# Patient Record
Sex: FEMALE | URBAN - METROPOLITAN AREA
[De-identification: names, ages, dates, MRNs, and addresses within clinical notes are randomized per-mention and may not be internally consistent; named-entity substitution may affect disease eponyms.]

---

## 2023-11-15 ENCOUNTER — OFFICE VISIT (OUTPATIENT)
Age: 58
End: 2023-11-15

## 2023-11-15 VITALS
WEIGHT: 139.5 LBS | OXYGEN SATURATION: 97 % | SYSTOLIC BLOOD PRESSURE: 144 MMHG | HEART RATE: 80 BPM | BODY MASS INDEX: 25.67 KG/M2 | DIASTOLIC BLOOD PRESSURE: 81 MMHG | HEIGHT: 62 IN | RESPIRATION RATE: 18 BRPM

## 2023-11-15 DIAGNOSIS — Z13.220 SCREENING FOR LIPID DISORDERS: ICD-10-CM

## 2023-11-15 DIAGNOSIS — R03.0 ELEVATED BLOOD PRESSURE READING IN OFFICE WITHOUT DIAGNOSIS OF HYPERTENSION: ICD-10-CM

## 2023-11-15 DIAGNOSIS — Z76.89 ENCOUNTER TO ESTABLISH CARE WITH NEW DOCTOR: ICD-10-CM

## 2023-11-15 DIAGNOSIS — M79.644 FINGER PAIN, RIGHT: Primary | ICD-10-CM

## 2023-11-15 DIAGNOSIS — E66.3 OVERWEIGHT (BMI 25.0-29.9): ICD-10-CM

## 2023-11-15 DIAGNOSIS — Z12.39 ENCOUNTER FOR OTHER SCREENING FOR MALIGNANT NEOPLASM OF BREAST: ICD-10-CM

## 2023-11-15 DIAGNOSIS — Z13.1 SCREENING FOR DIABETES MELLITUS: ICD-10-CM

## 2023-11-15 DIAGNOSIS — Z23 ENCOUNTER FOR IMMUNIZATION: ICD-10-CM

## 2023-11-15 LAB
ALBUMIN SERPL-MCNC: 4.1 G/DL (ref 3.5–5)
ALBUMIN/GLOB SERPL: 1.2 (ref 1.1–2.2)
ALP SERPL-CCNC: 96 U/L (ref 45–117)
ALT SERPL-CCNC: 62 U/L (ref 12–78)
ANION GAP SERPL CALC-SCNC: 6 MMOL/L (ref 5–15)
AST SERPL-CCNC: 38 U/L (ref 15–37)
BILIRUB SERPL-MCNC: 0.5 MG/DL (ref 0.2–1)
BUN SERPL-MCNC: 12 MG/DL (ref 6–20)
BUN/CREAT SERPL: 21 (ref 12–20)
CALCIUM SERPL-MCNC: 9.3 MG/DL (ref 8.5–10.1)
CHLORIDE SERPL-SCNC: 106 MMOL/L (ref 97–108)
CHOLEST SERPL-MCNC: 197 MG/DL
CO2 SERPL-SCNC: 28 MMOL/L (ref 21–32)
CREAT SERPL-MCNC: 0.57 MG/DL (ref 0.55–1.02)
ERYTHROCYTE [DISTWIDTH] IN BLOOD BY AUTOMATED COUNT: 12.9 % (ref 11.5–14.5)
EST. AVERAGE GLUCOSE BLD GHB EST-MCNC: 166 MG/DL
GLOBULIN SER CALC-MCNC: 3.3 G/DL (ref 2–4)
GLUCOSE SERPL-MCNC: 140 MG/DL (ref 65–100)
HBA1C MFR BLD: 7.4 % (ref 4–5.6)
HCT VFR BLD AUTO: 40.9 % (ref 35–47)
HDLC SERPL-MCNC: 47 MG/DL
HDLC SERPL: 4.2 (ref 0–5)
HGB BLD-MCNC: 12.9 G/DL (ref 11.5–16)
LDLC SERPL CALC-MCNC: 123.8 MG/DL (ref 0–100)
MCH RBC QN AUTO: 25.7 PG (ref 26–34)
MCHC RBC AUTO-ENTMCNC: 31.5 G/DL (ref 30–36.5)
MCV RBC AUTO: 81.5 FL (ref 80–99)
NRBC # BLD: 0 K/UL (ref 0–0.01)
NRBC BLD-RTO: 0 PER 100 WBC
PLATELET # BLD AUTO: 280 K/UL (ref 150–400)
PMV BLD AUTO: 11 FL (ref 8.9–12.9)
POTASSIUM SERPL-SCNC: 4.2 MMOL/L (ref 3.5–5.1)
PROT SERPL-MCNC: 7.4 G/DL (ref 6.4–8.2)
RBC # BLD AUTO: 5.02 M/UL (ref 3.8–5.2)
SODIUM SERPL-SCNC: 140 MMOL/L (ref 136–145)
TRIGL SERPL-MCNC: 131 MG/DL
VLDLC SERPL CALC-MCNC: 26.2 MG/DL
WBC # BLD AUTO: 6.9 K/UL (ref 3.6–11)

## 2023-11-15 SDOH — ECONOMIC STABILITY: INCOME INSECURITY: HOW HARD IS IT FOR YOU TO PAY FOR THE VERY BASICS LIKE FOOD, HOUSING, MEDICAL CARE, AND HEATING?: NOT HARD AT ALL

## 2023-11-15 SDOH — ECONOMIC STABILITY: FOOD INSECURITY: WITHIN THE PAST 12 MONTHS, THE FOOD YOU BOUGHT JUST DIDN'T LAST AND YOU DIDN'T HAVE MONEY TO GET MORE.: NEVER TRUE

## 2023-11-15 SDOH — ECONOMIC STABILITY: HOUSING INSECURITY
IN THE LAST 12 MONTHS, WAS THERE A TIME WHEN YOU DID NOT HAVE A STEADY PLACE TO SLEEP OR SLEPT IN A SHELTER (INCLUDING NOW)?: NO

## 2023-11-15 SDOH — ECONOMIC STABILITY: FOOD INSECURITY: WITHIN THE PAST 12 MONTHS, YOU WORRIED THAT YOUR FOOD WOULD RUN OUT BEFORE YOU GOT MONEY TO BUY MORE.: NEVER TRUE

## 2023-11-15 ASSESSMENT — PATIENT HEALTH QUESTIONNAIRE - PHQ9
SUM OF ALL RESPONSES TO PHQ QUESTIONS 1-9: 0
2. FEELING DOWN, DEPRESSED OR HOPELESS: 0
SUM OF ALL RESPONSES TO PHQ QUESTIONS 1-9: 0
1. LITTLE INTEREST OR PLEASURE IN DOING THINGS: 0
SUM OF ALL RESPONSES TO PHQ QUESTIONS 1-9: 0
SUM OF ALL RESPONSES TO PHQ9 QUESTIONS 1 & 2: 0
SUM OF ALL RESPONSES TO PHQ QUESTIONS 1-9: 0

## 2023-11-15 NOTE — PROGRESS NOTES
Iva Bello is an 60 y.o. female who presents to [facility]   Patient was previously receiving care at: [address] [facility] history significant for: arthritis    Patient requesting a referral for her arthritis. Right hand and arm. - bothers her worse during and after house work  - pain has been gradually getting worse over years  - ibuprofen and tylenol for pain with some relief  - pain worse to right pinky  - reports that the pain shoots up to her right arm up to her shoulder  - denies swelling, numbness, weakness  - no other joint pain    PSHx: hysterectomy    Fhx: T2DM    Shx  - tob: none  - alcohol: none  - THC/drugs: none    HM:  - due for PAP - 2019  - due for colonoscopy - declining referral today  - due for mammogram - last was 2019  - due for flu      Review of Systems   General/Constitutional:   No headache, fever, fatigue, weight loss or weight gain       Eyes:   No redness, pruritis, pain, visual changes, swelling, or discharge      Ears:    No pain, loss or changes in hearing     Neck:   No swelling, masses, stiffness, pain, or limited movement     Cardiac:    No chest pain      Respiratory:   No cough or shortness of breath     GI:   No nausea/vomiting, diarrhea, abdominal pain, bloody or dark stools       :   No dysuria or  hematuria    Neurological:   No loss of consciousness, dizziness, seizures, dysarthria, cognitive changes, memory changes,  problems with balance, or unilateral weakness     Skin: No rash     Current Medications  Current medications include:   No current outpatient medications on file. No current facility-administered medications for this visit. Allergies  No Known Allergies    Past Medical History  No past medical history on file. Past Surgical History   No past surgical history on file.     Family History  Family History   Problem Relation Age of Onset    Diabetes Mother     Osteoporosis Father     Diabetes Sister     Diabetes Brother        No FH of

## 2023-11-15 NOTE — PATIENT INSTRUCTIONS
- try voltaren gel four times daily for the next week for your pain  - check your blood pressure at home. Goal blood pressure is less than 140 (top number) and less than 90 (bottom number). Write down these values.   - call the provided number to schedule for a mammogram

## 2023-11-15 NOTE — PROGRESS NOTES
Vitals:    11/15/23 0802 11/15/23 0843   BP: (!) 145/84 (!) 144/81   Site: Left Upper Arm Left Upper Arm   Position: Sitting Sitting   Cuff Size: Medium Adult Medium Adult   Pulse: 80    Resp: 18    SpO2: 97%    Weight: 63.3 kg (139 lb 8 oz)    Height: 1.575 m (5' 2\")      BP Recheck Noted Above.  Physician notified

## 2023-11-15 NOTE — PROGRESS NOTES
Need referral for colo screening  Need MATEO screening  Need PAP done  Patient states she would like to see a rheumatologist for arthritis  When 13yrs she did break her clavicle and it repaired. She has  a bump on her upper right chest shes concerned about that she noticed for a while.   Chief Complaint   Patient presents with    New Patient     Vitals:    11/15/23 0802   BP: (!) 145/84   Pulse: 80   Resp: 18   SpO2: 97%

## 2023-11-28 ENCOUNTER — TELEPHONE (OUTPATIENT)
Age: 58
End: 2023-11-28

## 2023-11-28 NOTE — TELEPHONE ENCOUNTER
Called patient to discuss lab results:  Mildly elevated LDL cholesterol to 123.8  A1c of 7.4 with new diagnosis of T2DM  Normal GFR, creatinine  Normal CBC    Patient was about to board a plane for x2 weeks of vacation. Full discuss differed until follow up appointment on 12/15/23. Will plan for initiation of metformin, referral to diabetes education. Will discuss initiation of statin medication. Will discuss BP at that time.     Scotty Mon MD

## 2023-12-15 ENCOUNTER — OFFICE VISIT (OUTPATIENT)
Age: 58
End: 2023-12-15

## 2023-12-15 VITALS
WEIGHT: 137 LBS | HEIGHT: 62 IN | TEMPERATURE: 99.1 F | RESPIRATION RATE: 18 BRPM | OXYGEN SATURATION: 98 % | BODY MASS INDEX: 25.21 KG/M2 | HEART RATE: 84 BPM | DIASTOLIC BLOOD PRESSURE: 90 MMHG | SYSTOLIC BLOOD PRESSURE: 138 MMHG

## 2023-12-15 DIAGNOSIS — E78.00 ELEVATED LDL CHOLESTEROL LEVEL: ICD-10-CM

## 2023-12-15 DIAGNOSIS — I10 ESSENTIAL HYPERTENSION: ICD-10-CM

## 2023-12-15 DIAGNOSIS — E11.65 TYPE 2 DIABETES MELLITUS WITH HYPERGLYCEMIA, WITHOUT LONG-TERM CURRENT USE OF INSULIN (HCC): Primary | ICD-10-CM

## 2023-12-15 LAB
CREAT UR-MCNC: 41.7 MG/DL
MICROALBUMIN UR-MCNC: <0.5 MG/DL
MICROALBUMIN/CREAT UR-RTO: <12 MG/G (ref 0–30)

## 2023-12-15 RX ORDER — METFORMIN HYDROCHLORIDE 500 MG/1
500 TABLET, EXTENDED RELEASE ORAL
Qty: 90 TABLET | Refills: 1 | Status: SHIPPED | OUTPATIENT
Start: 2023-12-15

## 2023-12-15 RX ORDER — LANCETS 30 GAUGE
1 EACH MISCELLANEOUS DAILY
Qty: 100 EACH | Refills: 5 | Status: SHIPPED | OUTPATIENT
Start: 2023-12-15

## 2023-12-15 RX ORDER — BLOOD-GLUCOSE METER
1 KIT MISCELLANEOUS DAILY
Qty: 1 KIT | Refills: 0 | Status: SHIPPED | OUTPATIENT
Start: 2023-12-15

## 2023-12-15 RX ORDER — LOSARTAN POTASSIUM 25 MG/1
25 TABLET ORAL DAILY
Qty: 90 TABLET | Refills: 1 | Status: SHIPPED | OUTPATIENT
Start: 2023-12-15

## 2023-12-15 RX ORDER — GLUCOSAMINE HCL/CHONDROITIN SU 500-400 MG
CAPSULE ORAL
Qty: 100 STRIP | Refills: 1 | Status: SHIPPED | OUTPATIENT
Start: 2023-12-15

## 2023-12-15 ASSESSMENT — PATIENT HEALTH QUESTIONNAIRE - PHQ9
SUM OF ALL RESPONSES TO PHQ QUESTIONS 1-9: 0
SUM OF ALL RESPONSES TO PHQ9 QUESTIONS 1 & 2: 0
1. LITTLE INTEREST OR PLEASURE IN DOING THINGS: 0
SUM OF ALL RESPONSES TO PHQ QUESTIONS 1-9: 0
2. FEELING DOWN, DEPRESSED OR HOPELESS: 0

## 2023-12-15 NOTE — PATIENT INSTRUCTIONS
- start monitoring your home blood sugar. Check this once daily in the morning and as needed after meals or with symptoms  - keep a log of your blood sugars and bring this with you to your next appointment  - call to schedule an appointment with the diabetes educator  - start taking metformin 500mg once daily (with breakfast) for one week. After one week increase to twice daily (add an dose with dinner)    - Start monitoring your blood pressure at home and keep a log of these values. Check 3-4 times per week. - start taking losartan once daily. This is a blood pressure medication that is also commonly used in diabetes.

## 2024-01-08 ENCOUNTER — OFFICE VISIT (OUTPATIENT)
Age: 59
End: 2024-01-08
Payer: COMMERCIAL

## 2024-01-08 DIAGNOSIS — E11.9 TYPE 2 DIABETES MELLITUS WITHOUT COMPLICATION, WITHOUT LONG-TERM CURRENT USE OF INSULIN (HCC): Primary | ICD-10-CM

## 2024-01-08 PROCEDURE — G0108 DIAB MANAGE TRN  PER INDIV: HCPCS

## 2024-01-08 NOTE — PROGRESS NOTES
[facility]  Diabetes Self-Management Education & Support Program    Reason for Referral: DSMES  Referral Source: Joseph Kearney MD  Services requested: DSMES       ASSESSMENT    From my perspective, the participant would benefit from DSMES specifically related to reducing risks, healthy eating, monitoring, taking medications, physical activity, healthy coping, and problem solving. Will adapt DSMES program to build on participant's skills score, confidence score, and preparedness score as noted in the Diabetes Skills, Confidence, and Preparedness Index.    During the program, we will focus on providing DSMES that specifically addresses participant's interest in healthy eating and physical activity, as shown by their reported readiness to change.    The participant would be best served by attending weekly group class series.    Diabetes Self-Management Education Follow-up Visit: February 2023       Clinical Presentation  Chantale Betancur is a 58 y.o.  female referred for diabetes self-management education. Participant has Type 2 DM not on insulin for <1 year. Family history positive for diabetes. Patient reports not receiving DSMES services in the past. Most recent A1c value:   Lab Results   Component Value Date/Time    LABA1C 7.4 11/15/2023 09:12 AM         Diabetes-related medications:  Current dosing: metFORMIN - 500 MG    Blood Pressure Management  losartan - 25 MG      Lipid Management  This patient does not have an active medication from one of the medication groupers.      Clot Prevention  This patient does not have an active medication from one of the medication groupers.    Learning Assessment  Learning objectives Educator assessment (1/8/2024)   Diabetes Disease Process  The participant can   A) describe diabetes in basic terms;   B) state the type of diabetes they have; &   C) state accepted blood glucose targets.     Healthy Eating  The participant can   A) identify

## 2024-01-15 NOTE — PROGRESS NOTES
[facility]  [address] [place] [address]   Office [phone], Fax [phone]      Chief Complaint:     Chief Complaint   Patient presents with    Diabetes       SUBJECTIVE  Chantale Betancur is an 58 y.o. female who presents for T2DM and HTN follow up    #T2DM  - metformin 500mg once daily currently  - diet: mornings has cut down on bread. Has reduced other carbohydrate consumption including rice  - fasting morning home blood sugars ranging from 110-130  - denies numbness, weakness, diarrhea, abdominal pain, nuasea    #HTN  - home blood pressure generally 130-140/80s  - denies chest pain, headache, vision changes, shortness of breath    HM:  - due for pap.  - due for mammogram. Order placed at prior visit.  - due for colonoscopy. Previously declined. Remains hesitant today. Denies change in bowel habits, melena, hematochezia  - due for diabetic eye exam    Allergies   No Known Allergies      Medications   Current Outpatient Medications   Medication Sig    losartan (COZAAR) 25 MG tablet Take 1 tablet by mouth daily    metFORMIN (GLUCOPHAGE-XR) 500 MG extended release tablet Take 1 tablet by mouth with breakfast and with evening meal . After one week increase to twice daily - with breakfast and dinner    glucose monitoring kit 1 kit by Does not apply route daily Dispense per insurance coverage    blood glucose monitor strips Test four times a day & as needed for symptoms of irregular blood glucose. Dispense sufficient amount for indicated testing frequency plus additional to accommodate PRN testing needs.    Lancets MISC 1 each by Does not apply route daily     No current facility-administered medications for this visit.         Past surgical, medical and social history reviewed and updated as relevant to presenting concerns.     ROS: See HPI      OBJECTIVE  BP (!) 144/88   Pulse 75   Temp 99.1 °F (37.3 °C) (Oral)   Wt 60.8 kg (134 lb)   SpO2 98%   BMI 24.51 kg/m²

## 2024-01-17 ENCOUNTER — OFFICE VISIT (OUTPATIENT)
Age: 59
End: 2024-01-17
Payer: COMMERCIAL

## 2024-01-17 VITALS
OXYGEN SATURATION: 98 % | TEMPERATURE: 99.1 F | BODY MASS INDEX: 24.51 KG/M2 | WEIGHT: 134 LBS | HEART RATE: 75 BPM | SYSTOLIC BLOOD PRESSURE: 144 MMHG | DIASTOLIC BLOOD PRESSURE: 88 MMHG

## 2024-01-17 DIAGNOSIS — E78.00 ELEVATED LDL CHOLESTEROL LEVEL: ICD-10-CM

## 2024-01-17 DIAGNOSIS — E11.65 TYPE 2 DIABETES MELLITUS WITH HYPERGLYCEMIA, WITHOUT LONG-TERM CURRENT USE OF INSULIN (HCC): Primary | ICD-10-CM

## 2024-01-17 DIAGNOSIS — I10 ELEVATED BLOOD PRESSURE READING IN OFFICE WITH DIAGNOSIS OF HYPERTENSION: ICD-10-CM

## 2024-01-17 DIAGNOSIS — I10 ESSENTIAL HYPERTENSION: ICD-10-CM

## 2024-01-17 PROCEDURE — 99214 OFFICE O/P EST MOD 30 MIN: CPT

## 2024-01-17 PROCEDURE — 3079F DIAST BP 80-89 MM HG: CPT

## 2024-01-17 PROCEDURE — 3077F SYST BP >= 140 MM HG: CPT

## 2024-01-17 RX ORDER — LANCETS 30 GAUGE
1 EACH MISCELLANEOUS DAILY
Qty: 100 EACH | Refills: 5 | Status: SHIPPED | OUTPATIENT
Start: 2024-01-17

## 2024-01-17 RX ORDER — METFORMIN HYDROCHLORIDE 500 MG/1
500 TABLET, EXTENDED RELEASE ORAL 2 TIMES DAILY WITH MEALS
Qty: 180 TABLET | Refills: 3 | Status: SHIPPED | OUTPATIENT
Start: 2024-01-17

## 2024-01-17 RX ORDER — LOSARTAN POTASSIUM 25 MG/1
25 TABLET ORAL DAILY
Qty: 90 TABLET | Refills: 3 | Status: SHIPPED | OUTPATIENT
Start: 2024-01-17

## 2024-01-17 ASSESSMENT — PATIENT HEALTH QUESTIONNAIRE - PHQ9
SUM OF ALL RESPONSES TO PHQ QUESTIONS 1-9: 1
SUM OF ALL RESPONSES TO PHQ QUESTIONS 1-9: 1
1. LITTLE INTEREST OR PLEASURE IN DOING THINGS: 1
SUM OF ALL RESPONSES TO PHQ QUESTIONS 1-9: 1
2. FEELING DOWN, DEPRESSED OR HOPELESS: 0
SUM OF ALL RESPONSES TO PHQ QUESTIONS 1-9: 1
SUM OF ALL RESPONSES TO PHQ9 QUESTIONS 1 & 2: 1

## 2024-01-17 NOTE — PATIENT INSTRUCTIONS
- schedule an appointment to see your eye doctor  - increase your metformin to one pill in the morning and one pill in the evening.  - continue to take your blood pressure medication daily  - check your morning blood sugar and blood pressure 3 times weekly  - follow up in 2 months for repeat lab work and a routine cervical exam  - keep up with your diabetic diet and with routine exercise (200 minutes weekly)   no

## 2024-01-18 LAB
ANION GAP SERPL CALC-SCNC: 5 MMOL/L (ref 5–15)
BUN SERPL-MCNC: 11 MG/DL (ref 6–20)
BUN/CREAT SERPL: 19 (ref 12–20)
CALCIUM SERPL-MCNC: 9.1 MG/DL (ref 8.5–10.1)
CHLORIDE SERPL-SCNC: 108 MMOL/L (ref 97–108)
CO2 SERPL-SCNC: 29 MMOL/L (ref 21–32)
CREAT SERPL-MCNC: 0.58 MG/DL (ref 0.55–1.02)
GLUCOSE SERPL-MCNC: 123 MG/DL (ref 65–100)
POTASSIUM SERPL-SCNC: 4.2 MMOL/L (ref 3.5–5.1)
SODIUM SERPL-SCNC: 142 MMOL/L (ref 136–145)

## 2024-01-30 DIAGNOSIS — E11.65 TYPE 2 DIABETES MELLITUS WITH HYPERGLYCEMIA, WITHOUT LONG-TERM CURRENT USE OF INSULIN (HCC): ICD-10-CM

## 2024-01-30 RX ORDER — BLOOD SUGAR DIAGNOSTIC
STRIP MISCELLANEOUS
Qty: 100 STRIP | Refills: 1 | Status: SHIPPED | OUTPATIENT
Start: 2024-01-30

## 2024-02-06 ENCOUNTER — NURSE ONLY (OUTPATIENT)
Age: 59
End: 2024-02-06
Payer: COMMERCIAL

## 2024-02-06 DIAGNOSIS — E11.9 TYPE 2 DIABETES MELLITUS WITHOUT COMPLICATION, WITHOUT LONG-TERM CURRENT USE OF INSULIN (HCC): Primary | ICD-10-CM

## 2024-02-06 PROCEDURE — G0109 DIAB MANAGE TRN IND/GROUP: HCPCS

## 2024-02-07 NOTE — PROGRESS NOTES
[facility]  Diabetes Self-Management Education & Support Program  Encounter Note      SUMMARY  Diabetes self-care management training was completed related to reducing risks.The participant will return on February 13 to continue DSMES related to healthy eating and monitoring. The participant did identify SMART Goal(s) and will practice knowledge and skills related to reducing risks to improve diabetes self-management.        EVALUATION:  Participant was engaged in learning and shared in group discussions.  She was very interested in CGM.  She has several relaxation techniques that she uses for stress relief.  She is up to date on most annual exams and vaccines.      RECOMMENDATIONS:  Continue DSMSE classes  Eye appt     TOPICS DISCUSSED TODAY:  WHAT IS DIABETES? Minutes: 60  HOW DO I STAY HEALTHY? 60      Next provider visit is scheduled for 2/13/24       SMART GOAL(S)   Make appointment for diabetic eye exam before next class on 2/13/24  ACHIEVEMENT OF GOAL(S) : 0-24%    2.   Practice foot care over the next week.  ACHIEVEMENT OF GOAL(S) :  0-24%         DATE DSMES TOPIC EVALUATION     2/7/2024 WHAT IS DIABETES?   Role of the normal pancreas in energy balance and blood glucose control   The defect seen in diabetes   Signs & symptoms of diabetes   Diagnosis of diabetes   Types of diabetes   Blood glucose targets in non-pregnant & non-pregnant adults       The participant knows  Their type of diabetes: Yes   The basic physiologic defect: Yes  Blood glucose targets: Yes     DATE DSMES TOPIC EVALUATION     2/7/2024 HOW DO I STAY HEALTHY?   Prevention   Vaccinations   Preconception care (if applicable)  Examinations   Eye    Foot   Diabetic complications' prevention   Dental health   Heart health   Kidney Health   Nerve health   Sleep health      The participant has a personal diabetes care record to keep abreast of diabetes health Yes                Torri Garcia RN on 2/7/2024 at 1:19

## 2024-02-13 ENCOUNTER — NURSE ONLY (OUTPATIENT)
Age: 59
End: 2024-02-13
Payer: COMMERCIAL

## 2024-02-13 DIAGNOSIS — E11.9 TYPE 2 DIABETES MELLITUS WITHOUT COMPLICATION, WITHOUT LONG-TERM CURRENT USE OF INSULIN (HCC): Primary | ICD-10-CM

## 2024-02-13 PROCEDURE — G0109 DIAB MANAGE TRN IND/GROUP: HCPCS

## 2024-02-14 NOTE — PROGRESS NOTES
[facility]  Diabetes Self-Management Education & Support Program  Encounter Note      SUMMARY  Diabetes self-care management training was completed related to healthy eating and monitoring. The participant will return on February 20 to continue DSMES related to taking medications and physical activity. The participant did identify SMART Goal(s) and will practice knowledge and skills related to reducing risks and healthy eating and monitoring to improve diabetes self-management.        EVALUATION:  Participant shared in group discussions.  She shared that she met her personal SMART goal from last week.  She put together healthy plate for lunch meal and read food labels with accuracy. She was able to demonstrate proper technique with blood glucose monitoring.   She will return next week for physical activity and medications.    RECOMMENDATIONS:  Continue DSMES classes     TOPICS DISCUSSED TODAY:  WHAT CAN I EAT? 60  HOW CAN BLOOD GLUCOSE MONITORING HELP ME? 60      Next provider visit is scheduled for 2/20/24       SMART GOAL(S)  Practice building a balanced meal for lunch at least 7 times over the next week.  ACHIEVEMENT OF GOAL(S) : 0-24%    2.   Make appointment for diabetic eye exam before next class on 2/13/24   ACHIEVEMENT OF GOAL(S) :  %    3.   Practice foot care over the next week.   ACHIEVEMENT OF GOAL(S) :  %         DATE DSMES TOPIC EVALUATION     2/14/2024 WHAT CAN I EAT?   General principles   Determining a healthy weight   Nutritional terms & tools   Healthy Plate method   Carbohydrate Counting   Reading food labels   Free apps   Pregnancy recommendations      The participant   Uses Healthy Plate principles in constructing meals: Yes  Reads food labels in choosing acceptable foods: Yes         DATE DSMES TOPIC EVALUATION     2/14/2024 HOW CAN BLOOD GLUCOSE MONITORING HELP ME?   Value of blood glucose monitoring   Realistic expectations   Blood glucose monitoring

## 2024-02-20 ENCOUNTER — NURSE ONLY (OUTPATIENT)
Age: 59
End: 2024-02-20

## 2024-02-20 DIAGNOSIS — E11.9 TYPE 2 DIABETES MELLITUS WITHOUT COMPLICATION, WITH LONG-TERM CURRENT USE OF INSULIN (HCC): Primary | ICD-10-CM

## 2024-02-20 DIAGNOSIS — Z79.4 TYPE 2 DIABETES MELLITUS WITHOUT COMPLICATION, WITH LONG-TERM CURRENT USE OF INSULIN (HCC): Primary | ICD-10-CM

## 2024-02-27 ENCOUNTER — NURSE ONLY (OUTPATIENT)
Age: 59
End: 2024-02-27
Payer: COMMERCIAL

## 2024-02-27 DIAGNOSIS — E11.9 TYPE 2 DIABETES MELLITUS WITHOUT COMPLICATION, WITHOUT LONG-TERM CURRENT USE OF INSULIN (HCC): Primary | ICD-10-CM

## 2024-02-27 PROCEDURE — G0109 DIAB MANAGE TRN IND/GROUP: HCPCS

## 2024-02-28 NOTE — PROGRESS NOTES
Constructive strategies to normal responses    Exploring feelings & attitudes   Motivation: Cost versus benefits analysis   Problem-solving: Chain analysis   Obtaining support: Communicating   Family & friends   Health team   iii. Community   Stress   Symptoms   Managing stress   Fill your tool kit        The participant can identify people that support them in caring for their diabetes health: friends and family      The participant would like to pursue the following in keeping themselves healthy after completing the program:  Diabetes magazines         DATE DSMES TOPIC EVALUATION     2/28/2024 HOW DO I FIGURE OUT WHAT'S INFLUENCING MY BLOOD GLUCOSES?   Problem solving using SOAR   Set goals   Sort options   Arrive at a plan   Reaffirm plan   Common problems in diabetes self-care   Hypoglycemia   Hyperglycemia   Sick days   Pattern management   Disaster preparedness plan        The participant has an action plan for   Hypoglycemia: Yes  Hyperglycemia: Yes  Sick days: Yes  During disasters: Yes           LUIS JUNG RN on 2/28/2024 at 8:45 AM    I have personally reviewed the health record, including provider notes, laboratory data and current medications before making these care and education recommendations. The time spent in this effort is included in the total time.  Total minutes: 120

## 2024-03-19 ENCOUNTER — HOSPITAL ENCOUNTER (OUTPATIENT)
Facility: HOSPITAL | Age: 59
Discharge: HOME | End: 2024-03-22
Payer: COMMERCIAL

## 2024-03-19 VITALS — BODY MASS INDEX: 26.31 KG/M2 | HEIGHT: 60 IN | WEIGHT: 134 LBS

## 2024-03-19 DIAGNOSIS — Z12.39 ENCOUNTER FOR OTHER SCREENING FOR MALIGNANT NEOPLASM OF BREAST: ICD-10-CM

## 2024-03-19 PROCEDURE — 77063 BREAST TOMOSYNTHESIS BI: CPT

## 2024-03-19 NOTE — PROGRESS NOTES
[facility]  [address] [place] [address]   Office [phone], Fax [phone]      Chief Complaint:     Chief Complaint   Patient presents with    Diabetes     Diabetes follow up and pap smear. No concerns. Mammogram yesterday at [facility] in [place].       SUBJECTIVE  Chantale Betancur is an 58 y.o. female who presents for T2DM follow up and cervical cancer screening.    #T2DM  - home blood sugar range has been between 104-130 in the morning  - she has been taking her metformin 500mg twice daily  - she had some diarrhea after increasing to two pills but this resolved  - dietary changes: she has been working with diabetes education to reduce carbohydrates and sugar. She has been working to keep her carbohydrates to less than 45g daily  - eye doctor on March 26th  - denies weakness, numbness, tingling    #HTN  - home blood pressure: has been less than 131/86  - denies chest pain, vision changes, SOB    GYN  - feels that her bladder has dropped  - no blood, dysuria,   - but mass like sensation    Allergies   No Known Allergies      Medications   Current Outpatient Medications   Medication Sig    Accu-Chek Softclix Lancets MISC USE AS DIRECTED    blood glucose test strips (ACCU-CHEK GUIDE) strip USE TO TEST 4 TIMES DAILY AS NEEDED FOR SYMPTOMS OF IRREGULAR BLOOD GLUCOSE    losartan (COZAAR) 25 MG tablet Take 1 tablet by mouth daily    metFORMIN (GLUCOPHAGE-XR) 500 MG extended release tablet Take 1 tablet by mouth with breakfast and with evening meal . After one week increase to twice daily - with breakfast and dinner    Lancets MISC 1 each by Does not apply route daily    glucose monitoring kit 1 kit by Does not apply route daily Dispense per insurance coverage     No current facility-administered medications for this visit.         Past surgical, medical and social history reviewed and updated as relevant to presenting concerns.     ROS: See HPI      OBJECTIVE  /74

## 2024-03-20 ENCOUNTER — OFFICE VISIT (OUTPATIENT)
Age: 59
End: 2024-03-20

## 2024-03-20 ENCOUNTER — HOSPITAL ENCOUNTER (OUTPATIENT)
Facility: HOSPITAL | Age: 59
Setting detail: SPECIMEN
Discharge: HOME | End: 2024-03-23
Payer: COMMERCIAL

## 2024-03-20 VITALS
TEMPERATURE: 98.4 F | DIASTOLIC BLOOD PRESSURE: 74 MMHG | WEIGHT: 130 LBS | HEART RATE: 80 BPM | HEIGHT: 60 IN | SYSTOLIC BLOOD PRESSURE: 126 MMHG | OXYGEN SATURATION: 98 % | BODY MASS INDEX: 25.52 KG/M2

## 2024-03-20 DIAGNOSIS — N81.10 PELVIC ORGAN PROLAPSE QUANTIFICATION STAGE 1 CYSTOCELE: ICD-10-CM

## 2024-03-20 DIAGNOSIS — Z12.4 SCREENING FOR CERVICAL CANCER: ICD-10-CM

## 2024-03-20 DIAGNOSIS — I10 ESSENTIAL HYPERTENSION: ICD-10-CM

## 2024-03-20 DIAGNOSIS — E11.65 TYPE 2 DIABETES MELLITUS WITH HYPERGLYCEMIA, WITHOUT LONG-TERM CURRENT USE OF INSULIN (HCC): Primary | ICD-10-CM

## 2024-03-20 DIAGNOSIS — E78.00 ELEVATED LDL CHOLESTEROL LEVEL: ICD-10-CM

## 2024-03-20 PROCEDURE — 87624 HPV HI-RISK TYP POOLED RSLT: CPT

## 2024-03-20 PROCEDURE — 88175 CYTOPATH C/V AUTO FLUID REDO: CPT

## 2024-03-20 RX ORDER — LANCETS
EACH MISCELLANEOUS
COMMUNITY
Start: 2024-01-17

## 2024-03-20 ASSESSMENT — PATIENT HEALTH QUESTIONNAIRE - PHQ9
2. FEELING DOWN, DEPRESSED OR HOPELESS: SEVERAL DAYS
SUM OF ALL RESPONSES TO PHQ QUESTIONS 1-9: 2
1. LITTLE INTEREST OR PLEASURE IN DOING THINGS: SEVERAL DAYS
SUM OF ALL RESPONSES TO PHQ9 QUESTIONS 1 & 2: 2

## 2024-03-20 NOTE — PATIENT INSTRUCTIONS
- we will plan to follow up in x6 months. Sooner depending on the lab work.  - depending on your LDL level today, we may consider adding a statin medication to your regiment to reduce cardiovascular risk  - keep up the good work with diabetic diet! Continue to check you blood sugar daily

## 2024-03-20 NOTE — PROGRESS NOTES
[facility]  [address] [place] [address]   Office [phone], Fax [phone]      Chief Complaint:     Chief Complaint   Patient presents with    Diabetes     Diabetes follow up and pap smear. No concerns. Mammogram yesterday at [facility] in [place].       SUBJECTIVE  Chantale Betancur is an 58 y.o. female who presents for T2DM follow up and cervical cancer screening.    #T2DM  - home blood sugar range has been between 104-130 in the morning  - she has been taking her metformin 500mg twice daily  - she had some diarrhea after increasing to two pills but this resolved over the course of a few days  - dietary changes: she has been working with diabetes education to reduce carbohydrates and sugar. She has been working to keep her carbohydrates to less than 45g daily  - eye doctor on March 26th  - denies weakness, numbness, tingling    #HTN  - home blood pressure: has been less than 130/85 routinely  - denies chest pain, vision changes, SOB    #Pelvic fullness  - feels that her bladder has dropped  - no blood, dysuria, pain, irritation, incontinence  - she reports a persistent mass/pressure like sensation    #HM  - patient reports prior hysterectomy  - she reports normal prior cervical exam checks in the past    Allergies   No Known Allergies      Medications   Current Outpatient Medications   Medication Sig    Accu-Chek Softclix Lancets MISC USE AS DIRECTED    blood glucose test strips (ACCU-CHEK GUIDE) strip USE TO TEST 4 TIMES DAILY AS NEEDED FOR SYMPTOMS OF IRREGULAR BLOOD GLUCOSE    losartan (COZAAR) 25 MG tablet Take 1 tablet by mouth daily    metFORMIN (GLUCOPHAGE-XR) 500 MG extended release tablet Take 1 tablet by mouth with breakfast and with evening meal . After one week increase to twice daily - with breakfast and dinner    Lancets MISC 1 each by Does not apply route daily    glucose monitoring kit 1 kit by Does not apply route daily Dispense per insurance coverage

## 2024-03-20 NOTE — PROGRESS NOTES
Chantale Betancur is a 58 y.o. female      Chief Complaint   Patient presents with    Diabetes     Diabetes follow up and pap smear. No concerns. Mammogram yesterday at [facility] in [place].       \"Have you been to the ER, urgent care clinic since your last visit?  Hospitalized since your last visit?\"    NO    “Have you seen or consulted any other health care providers outside of [facility] since your last visit?”    NO    “Have you had a colorectal cancer screening such as a colonoscopy/FIT/Cologuard?    NO    No colonoscopy on file  No cologuard on file  No FIT/FOBT on file   No flexible sigmoidoscopy on file         “Have you had a pap smear?”    YES - Where: [facility] Nurse/CMA to request most recent records if not in the chart                Vitals:    03/20/24 0842   BP: 126/74   Site: Right Upper Arm   Position: Sitting   Cuff Size: Medium Adult   Pulse: 80   Temp: 98.4 °F (36.9 °C)   TempSrc: Oral   SpO2: 98%   Weight: 59 kg (130 lb)   Height: 1.53 m (5' 0.24\")           Health Maintenance Due   Topic Date Due    Hepatitis B vaccine (1 of 3 - 3-dose series) Never done    COVID-19 Vaccine (1) Never done    Pneumococcal 0-64 years Vaccine (1 of 2 - PCV) Never done    HIV screen  Never done    Diabetic retinal exam  Never done    Hepatitis C screen  Never done    DTaP/Tdap/Td vaccine (1 - Tdap) Never done    Cervical cancer screen  Never done    Colorectal Cancer Screen  Never done    Breast cancer screen  Never done    Shingles vaccine (1 of 2) Never done       Medication Reconciliation Completed, changes notes. Please Update medication list.

## 2024-03-21 LAB
CHOLEST SERPL-MCNC: 181 MG/DL
ERYTHROCYTE [DISTWIDTH] IN BLOOD BY AUTOMATED COUNT: 13.4 % (ref 11.5–14.5)
EST. AVERAGE GLUCOSE BLD GHB EST-MCNC: 140 MG/DL
HBA1C MFR BLD: 6.5 % (ref 4–5.6)
HCT VFR BLD AUTO: 38.1 % (ref 35–47)
HDLC SERPL-MCNC: 53 MG/DL
HDLC SERPL: 3.4 (ref 0–5)
HGB BLD-MCNC: 12.6 G/DL (ref 11.5–16)
LDLC SERPL CALC-MCNC: 112.8 MG/DL (ref 0–100)
MCH RBC QN AUTO: 26.4 PG (ref 26–34)
MCHC RBC AUTO-ENTMCNC: 33.1 G/DL (ref 30–36.5)
MCV RBC AUTO: 79.9 FL (ref 80–99)
NRBC # BLD: 0 K/UL (ref 0–0.01)
NRBC BLD-RTO: 0 PER 100 WBC
PLATELET # BLD AUTO: 296 K/UL (ref 150–400)
PMV BLD AUTO: 11.1 FL (ref 8.9–12.9)
RBC # BLD AUTO: 4.77 M/UL (ref 3.8–5.2)
TRIGL SERPL-MCNC: 76 MG/DL
VLDLC SERPL CALC-MCNC: 15.2 MG/DL
WBC # BLD AUTO: 6.9 K/UL (ref 3.6–11)

## 2024-03-28 ENCOUNTER — TELEPHONE (OUTPATIENT)
Age: 59
End: 2024-03-28

## 2024-03-29 ENCOUNTER — TELEPHONE (OUTPATIENT)
Age: 59
End: 2024-03-29

## 2024-03-29 DIAGNOSIS — E78.00 ELEVATED LDL CHOLESTEROL LEVEL: Primary | ICD-10-CM

## 2024-03-29 RX ORDER — ROSUVASTATIN CALCIUM 10 MG/1
10 TABLET, COATED ORAL NIGHTLY
Qty: 30 TABLET | Refills: 3 | Status: SHIPPED | OUTPATIENT
Start: 2024-03-29

## 2024-03-29 NOTE — TELEPHONE ENCOUNTER
Called and spoke with patient regarding lab work.    A1c of 6.5%. This is at goal. No changes to current diabetes regiment.    LDL of 112.8, down from 123.8. Goal LDL of less than 70 given T2DM and HTN. Continued stating discussion from prior visit. Patient amenable to start statin. Will prescribed crestor 10mg nightly. Discussed risks, benefits, alternatives, and possible side-effects of this medication with the patient who verbalizes understanding. Will need repeat lipid panel in 4-12 weeks to monitor for efficacy.    Joseph Kearney MD

## 2024-04-09 ENCOUNTER — OFFICE VISIT (OUTPATIENT)
Age: 59
End: 2024-04-09
Payer: COMMERCIAL

## 2024-04-09 DIAGNOSIS — E11.9 TYPE 2 DIABETES MELLITUS WITHOUT COMPLICATION, WITHOUT LONG-TERM CURRENT USE OF INSULIN (HCC): Primary | ICD-10-CM

## 2024-04-09 PROCEDURE — G0108 DIAB MANAGE TRN  PER INDIV: HCPCS

## 2024-04-09 NOTE — PROGRESS NOTES
[facility]  Diabetes Self-Management Education & Support Program  Post-program Evaluation    EVALUATION @ COMPLETION OF THE PROGRAM    Chantale Betancur completed 8 hours of diabetes self-management education. The participant acquired new knowledge and demonstrated new skills during the program.     The participant worked on the following SMART GOAL(s) to improve their diabetes self-management:    Increase physical activity by dooing chair exercises for 15 minutes 4 times over the next week.  ACHIEVEMENT OF GOAL(S) : %    2.   Practice building a balanced meal for lunch at least 7 times over the next week.   ACHIEVEMENT OF GOAL(S) : %    3.   Practice foot care over the next week.   ACHIEVEMENT OF GOAL(S) : %    The participant's pre-program A1c was   Lab Results   Component Value Date/Time    LABA1C 6.5 03/20/2024 09:40 AM   . The post-evaluation A1c is 6.5.    The participant improved their Diabetes Skills, Confidence and Preparedness Index (scored out of 7):    Total score: 5.9  Skills: 5.8  Confidence: 5.9  Preparedness: 6.0    FINAL RECOMMENDATIONS:  Participant is here today for 6 week follow up. She shared that she has been doing well with her diabetes management.  Her blood glucose levels are averaging between 106-130.  She shared that she is walking 4 times per week for physical activity.  She continues to use skills obtained in DSMES classes.  Follow up as needed.    Next provider visit is scheduled for as needed       LUIS JUNG RN on 4/9/2024     Metric Patient's responses (4/9/2024) Areas for improvement     Healthy Eating       The participant is using the Healthy Plate to control carbohydrate intake - Yes    The participant reads food labels accurately - Yes          Being Active       The participant performs physical activity where your heart beats faster and your breathing is harder than normal for 30 minutes or more? 4 day(s)     In a typical week,

## 2024-04-28 DIAGNOSIS — E78.00 ELEVATED LDL CHOLESTEROL LEVEL: ICD-10-CM

## 2024-04-29 RX ORDER — ROSUVASTATIN CALCIUM 10 MG/1
10 TABLET, COATED ORAL NIGHTLY
Qty: 90 TABLET | Refills: 3 | Status: SHIPPED | OUTPATIENT
Start: 2024-04-29

## 2024-04-29 NOTE — TELEPHONE ENCOUNTER
Medication Refill Request    Chantale Betancur is requesting a refill of the following medication(s):   Requested Prescriptions     Pending Prescriptions Disp Refills    rosuvastatin (CRESTOR) 10 MG tablet [Pharmacy Med Name: ROSUVASTATIN CALCIUM 10 MG TAB] 90 tablet 1     Sig: TAKE 1 TABLET BY MOUTH EVERY DAY AT NIGHT        Listed PCP is Joseph Kearney MD   Last provider to prescribe medication: Caio  Last Date of Medication Prescribed: 3/29/2024   Date of Last Office Visit at [facility]: 3/20/2024   FUTURE APPOINTMENT:   Future Appointments   Date Time Provider Department Center   6/4/2024  8:00 AM LAB [facility] BS AMB       Please send refill to:    [id number] - [place], [place] - [address] [place] [address] - P [phone] - F [phone]  [address] [place] [id number]  Phone: [phone] Fax: [phone]      Please review request and approve or deny with recommendations.

## 2024-05-01 ENCOUNTER — CLINICAL DOCUMENTATION (OUTPATIENT)
Age: 59
End: 2024-05-01

## 2024-05-01 NOTE — PROGRESS NOTES
Records received from [facility] 4/22/24.    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 PT if worsens.    Records sent for scan.    Joseph Kearney MD

## 2024-06-04 ENCOUNTER — NURSE ONLY (OUTPATIENT)
Age: 59
End: 2024-06-04

## 2024-06-04 DIAGNOSIS — E78.00 ELEVATED LDL CHOLESTEROL LEVEL: ICD-10-CM

## 2024-06-04 LAB
CHOLEST SERPL-MCNC: 127 MG/DL
HDLC SERPL-MCNC: 55 MG/DL
HDLC SERPL: 2.3 (ref 0–5)
LDLC SERPL CALC-MCNC: 59.6 MG/DL (ref 0–100)
TRIGL SERPL-MCNC: 62 MG/DL
VLDLC SERPL CALC-MCNC: 12.4 MG/DL

## 2024-11-30 DIAGNOSIS — E11.65 TYPE 2 DIABETES MELLITUS WITH HYPERGLYCEMIA, WITHOUT LONG-TERM CURRENT USE OF INSULIN (HCC): ICD-10-CM

## 2024-12-03 RX ORDER — METFORMIN HYDROCHLORIDE 500 MG/1
500 TABLET, EXTENDED RELEASE ORAL 2 TIMES DAILY WITH MEALS
Qty: 180 TABLET | Refills: 3 | Status: SHIPPED | OUTPATIENT
Start: 2024-12-03

## 2024-12-29 DIAGNOSIS — E11.65 TYPE 2 DIABETES MELLITUS WITH HYPERGLYCEMIA, WITHOUT LONG-TERM CURRENT USE OF INSULIN (HCC): ICD-10-CM

## 2024-12-31 NOTE — TELEPHONE ENCOUNTER
Medication Refill Request    Chantale Betancur is requesting a refill of the following medication(s):   Requested Prescriptions     Pending Prescriptions Disp Refills    ACCU-CHEK GUIDE strip [Pharmacy Med Name: ACCU-CHEK GUIDE TEST STRIP] 100 strip 1     Sig: USE TO TEST 4 TIMES DAILY AS NEEDED FOR SYMPTOMS OF IRREGULAR BLOOD GLUCOSE        Listed PCP is Joseph Kearney MD   Last provider to prescribe medication: n/a  Last Date of Medication Prescribed: 1/17/24   Date of Last Office Visit at [facility]: 3/20/24   FUTURE APPOINTMENT: No future appointments.    Please send refill to:    [id number] [place], [place] - [address] [place] [address] - P [phone] - F [phone]  [address] [place] [address]  Phone: [phone] Fax: [phone]      Please review request and approve or deny with recommendations.

## 2025-01-03 RX ORDER — BLOOD SUGAR DIAGNOSTIC
STRIP MISCELLANEOUS
Qty: 100 STRIP | Refills: 1 | Status: SHIPPED | OUTPATIENT
Start: 2025-01-03

## 2025-02-24 DIAGNOSIS — I10 ESSENTIAL HYPERTENSION: ICD-10-CM

## 2025-02-24 RX ORDER — LOSARTAN POTASSIUM 25 MG/1
25 TABLET ORAL DAILY
Qty: 90 TABLET | Refills: 0 | Status: SHIPPED | OUTPATIENT
Start: 2025-02-24

## 2025-02-24 NOTE — TELEPHONE ENCOUNTER
Medication Refill Request    Chantale Betancur is requesting a refill of the following medication(s):   Requested Prescriptions     Pending Prescriptions Disp Refills    losartan (COZAAR) 25 MG tablet [Pharmacy Med Name: LOSARTAN POTASSIUM 25 MG TAB] 90 tablet 3     Sig: TAKE 1 TABLET BY MOUTH EVERY DAY        Listed PCP is Joseph Kearney MD   Last provider to prescribe medication: Caio  Last Date of Medication Prescribed: 1/17/24   Date of Last Office Visit at [facility]: 3/20/24   FUTURE APPOINTMENT: No future appointments.    Please send refill to:    [id number] - [place], [place] - [facility] -  [phone] - F [phone] [address] [place] [address]  Phone: [phone] Fax: [phone]      Please review request and approve or deny with recommendations.

## 2025-03-20 DIAGNOSIS — I10 ESSENTIAL HYPERTENSION: ICD-10-CM

## 2025-03-21 RX ORDER — LOSARTAN POTASSIUM 25 MG/1
25 TABLET ORAL DAILY
Qty: 90 TABLET | Refills: 3 | Status: SHIPPED | OUTPATIENT
Start: 2025-03-21